# Patient Record
Sex: FEMALE
[De-identification: names, ages, dates, MRNs, and addresses within clinical notes are randomized per-mention and may not be internally consistent; named-entity substitution may affect disease eponyms.]

---

## 2017-12-12 ENCOUNTER — APPOINTMENT (OUTPATIENT)
Dept: ORTHOPEDIC SURGERY | Facility: CLINIC | Age: 57
End: 2017-12-12
Payer: MEDICAID

## 2017-12-12 VITALS — HEART RATE: 64 BPM | DIASTOLIC BLOOD PRESSURE: 79 MMHG | SYSTOLIC BLOOD PRESSURE: 121 MMHG

## 2017-12-12 DIAGNOSIS — M19.049 PRIMARY OSTEOARTHRITIS, UNSPECIFIED HAND: ICD-10-CM

## 2017-12-12 PROCEDURE — 99203 OFFICE O/P NEW LOW 30 MIN: CPT

## 2018-10-04 ENCOUNTER — APPOINTMENT (OUTPATIENT)
Dept: INTERNAL MEDICINE | Facility: CLINIC | Age: 58
End: 2018-10-04
Payer: MEDICAID

## 2018-10-04 VITALS — SYSTOLIC BLOOD PRESSURE: 110 MMHG | DIASTOLIC BLOOD PRESSURE: 80 MMHG

## 2018-10-04 VITALS
BODY MASS INDEX: 26.52 KG/M2 | TEMPERATURE: 98.7 F | OXYGEN SATURATION: 97 % | WEIGHT: 165 LBS | HEIGHT: 66 IN | HEART RATE: 80 BPM

## 2018-10-04 DIAGNOSIS — Z78.9 OTHER SPECIFIED HEALTH STATUS: ICD-10-CM

## 2018-10-04 DIAGNOSIS — Z12.11 ENCOUNTER FOR SCREENING FOR MALIGNANT NEOPLASM OF COLON: ICD-10-CM

## 2018-10-04 DIAGNOSIS — Z82.5 FAMILY HISTORY OF ASTHMA AND OTHER CHRONIC LOWER RESPIRATORY DISEASES: ICD-10-CM

## 2018-10-04 DIAGNOSIS — I34.0 NONRHEUMATIC MITRAL (VALVE) INSUFFICIENCY: ICD-10-CM

## 2018-10-04 DIAGNOSIS — Z82.49 FAMILY HISTORY OF ISCHEMIC HEART DISEASE AND OTHER DISEASES OF THE CIRCULATORY SYSTEM: ICD-10-CM

## 2018-10-04 DIAGNOSIS — Z87.442 PERSONAL HISTORY OF URINARY CALCULI: ICD-10-CM

## 2018-10-04 DIAGNOSIS — L40.9 PSORIASIS, UNSPECIFIED: ICD-10-CM

## 2018-10-04 PROCEDURE — 99204 OFFICE O/P NEW MOD 45 MIN: CPT | Mod: Q5

## 2018-10-04 RX ORDER — MELOXICAM 15 MG/1
15 TABLET ORAL DAILY
Qty: 30 | Refills: 1 | Status: DISCONTINUED | COMMUNITY
Start: 2017-12-12 | End: 2018-10-04

## 2018-10-04 NOTE — HISTORY OF PRESENT ILLNESS
[FreeTextEntry1] : here as a new patient [de-identified] : has a uri on eweek ago and now ear hurts

## 2018-10-04 NOTE — PHYSICAL EXAM
[No Acute Distress] : no acute distress [Well Nourished] : well nourished [Well Developed] : well developed [Well-Appearing] : well-appearing [Normal Sclera/Conjunctiva] : normal sclera/conjunctiva [PERRL] : pupils equal round and reactive to light [EOMI] : extraocular movements intact [Normal Outer Ear/Nose] : the outer ears and nose were normal in appearance [Normal Oropharynx] : the oropharynx was normal [No JVD] : no jugular venous distention [Supple] : supple [No Lymphadenopathy] : no lymphadenopathy [Thyroid Normal, No Nodules] : the thyroid was normal and there were no nodules present [No Respiratory Distress] : no respiratory distress  [Clear to Auscultation] : lungs were clear to auscultation bilaterally [No Accessory Muscle Use] : no accessory muscle use [Normal Rate] : normal rate  [Regular Rhythm] : with a regular rhythm [Normal S1, S2] : normal S1 and S2 [No Carotid Bruits] : no carotid bruits [No Abdominal Bruit] : a ~M bruit was not heard ~T in the abdomen [No Varicosities] : no varicosities [Pedal Pulses Present] : the pedal pulses are present [No Edema] : there was no peripheral edema [No Extremity Clubbing/Cyanosis] : no extremity clubbing/cyanosis [No Palpable Aorta] : no palpable aorta [Declined Breast Exam] : declined breast exam  [Soft] : abdomen soft [Non Tender] : non-tender [Non-distended] : non-distended [No Masses] : no abdominal mass palpated [No HSM] : no HSM [Normal Bowel Sounds] : normal bowel sounds [Declined Rectal Exam] : declined rectal exam [Normal Posterior Cervical Nodes] : no posterior cervical lymphadenopathy [Normal Anterior Cervical Nodes] : no anterior cervical lymphadenopathy [No CVA Tenderness] : no CVA  tenderness [No Spinal Tenderness] : no spinal tenderness [No Joint Swelling] : no joint swelling [Grossly Normal Strength/Tone] : grossly normal strength/tone [No Rash] : no rash [Normal Gait] : normal gait [Coordination Grossly Intact] : coordination grossly intact [No Focal Deficits] : no focal deficits [Deep Tendon Reflexes (DTR)] : deep tendon reflexes were 2+ and symmetric [Normal Affect] : the affect was normal [Normal Insight/Judgement] : insight and judgment were intact [de-identified] : grade 2-3 sys blowing murmur at apex, to left lat decub position

## 2018-10-04 NOTE — HEALTH RISK ASSESSMENT
[Any fall with injury in past year] : Patient reported fall with injury in the past year [0] : 2) Feeling down, depressed, or hopeless: Not at all (0) [] : No [de-identified] : PT reports being a social drinker. [de-identified] : pt reports breaking her hand this summer. [[id number]] : 0

## 2018-10-04 NOTE — PLAN
[FreeTextEntry1] : echo\par Study for H pylori: advised important to be checked for that\par same meds. \par no other changes today.\par see ENT for cerumenosis

## 2018-10-04 NOTE — REVIEW OF SYSTEMS
[Negative] : Eyes [FreeTextEntry5] : has a murmur [FreeTextEntry6] : no asthma [FreeTextEntry7] : has GERD, now on omeprazole, never had a upper endocoscopy.had cologuard [FreeTextEntry8] : two small stones stable,  [FreeTextEntry9] : leg cramps/ knee is ok [de-identified] : psoarais

## 2018-10-04 NOTE — ASSESSMENT
[FreeTextEntry1] : GERD, on meds, needs to be checked for H pylori,\par colonoscopy\par also document cardiac lesion.\par also same meds\par  see ent for f/u on cerumenosis

## 2018-10-31 ENCOUNTER — APPOINTMENT (OUTPATIENT)
Dept: OTOLARYNGOLOGY | Facility: CLINIC | Age: 58
End: 2018-10-31
Payer: MEDICAID

## 2018-10-31 ENCOUNTER — MEDICATION RENEWAL (OUTPATIENT)
Age: 58
End: 2018-10-31

## 2018-10-31 VITALS
HEIGHT: 66 IN | WEIGHT: 165 LBS | BODY MASS INDEX: 26.52 KG/M2 | SYSTOLIC BLOOD PRESSURE: 123 MMHG | DIASTOLIC BLOOD PRESSURE: 76 MMHG | HEART RATE: 69 BPM

## 2018-10-31 DIAGNOSIS — H60.8X3 OTHER OTITIS EXTERNA, BILATERAL: ICD-10-CM

## 2018-10-31 DIAGNOSIS — H61.20 IMPACTED CERUMEN, UNSPECIFIED EAR: ICD-10-CM

## 2018-10-31 PROCEDURE — 99203 OFFICE O/P NEW LOW 30 MIN: CPT

## 2018-11-14 ENCOUNTER — APPOINTMENT (OUTPATIENT)
Dept: OTOLARYNGOLOGY | Facility: CLINIC | Age: 58
End: 2018-11-14
Payer: MEDICAID

## 2018-11-14 VITALS
SYSTOLIC BLOOD PRESSURE: 122 MMHG | WEIGHT: 164 LBS | HEIGHT: 66 IN | HEART RATE: 67 BPM | BODY MASS INDEX: 26.36 KG/M2 | DIASTOLIC BLOOD PRESSURE: 75 MMHG

## 2018-11-14 PROCEDURE — 99213 OFFICE O/P EST LOW 20 MIN: CPT

## 2018-12-03 ENCOUNTER — MEDICATION RENEWAL (OUTPATIENT)
Age: 58
End: 2018-12-03

## 2019-01-09 ENCOUNTER — APPOINTMENT (OUTPATIENT)
Dept: INTERNAL MEDICINE | Facility: CLINIC | Age: 59
End: 2019-01-09
Payer: MEDICAID

## 2019-01-09 VITALS — SYSTOLIC BLOOD PRESSURE: 102 MMHG | DIASTOLIC BLOOD PRESSURE: 80 MMHG

## 2019-01-09 VITALS
HEIGHT: 66 IN | WEIGHT: 167 LBS | TEMPERATURE: 97.2 F | HEART RATE: 78 BPM | OXYGEN SATURATION: 97 % | BODY MASS INDEX: 26.84 KG/M2

## 2019-01-09 DIAGNOSIS — R05 COUGH: ICD-10-CM

## 2019-01-09 PROCEDURE — 99213 OFFICE O/P EST LOW 20 MIN: CPT

## 2019-01-09 NOTE — HISTORY OF PRESENT ILLNESS
[FreeTextEntry1] : here with cough and anxiety/ sick for more than two weeks [de-identified] : doing wwell.  had a uri as did her son

## 2019-01-09 NOTE — HEALTH RISK ASSESSMENT
[One fall no injury in past year] : Patient reported one fall in the past year without injury [0] : 2) Feeling down, depressed, or hopeless: Not at all (0) [] : No [[id number]] : 0

## 2019-01-09 NOTE — PHYSICAL EXAM
[No Acute Distress] : no acute distress [Well Nourished] : well nourished [Well Developed] : well developed [Normal Outer Ear/Nose] : the outer ears and nose were normal in appearance [Normal Oropharynx] : the oropharynx was normal [No Respiratory Distress] : no respiratory distress  [Clear to Auscultation] : lungs were clear to auscultation bilaterally [Normal Rate] : normal rate  [Regular Rhythm] : with a regular rhythm [de-identified] : Shauna dupont

## 2019-02-19 ENCOUNTER — RX RENEWAL (OUTPATIENT)
Age: 59
End: 2019-02-19

## 2019-03-20 ENCOUNTER — RX RENEWAL (OUTPATIENT)
Age: 59
End: 2019-03-20

## 2019-04-19 ENCOUNTER — RX RENEWAL (OUTPATIENT)
Age: 59
End: 2019-04-19

## 2019-04-21 ENCOUNTER — MOBILE ON CALL (OUTPATIENT)
Age: 59
End: 2019-04-21

## 2019-04-22 ENCOUNTER — RX RENEWAL (OUTPATIENT)
Age: 59
End: 2019-04-22

## 2019-06-01 ENCOUNTER — RX RENEWAL (OUTPATIENT)
Age: 59
End: 2019-06-01

## 2019-06-12 ENCOUNTER — RX RENEWAL (OUTPATIENT)
Age: 59
End: 2019-06-12

## 2019-06-17 ENCOUNTER — MEDICATION RENEWAL (OUTPATIENT)
Age: 59
End: 2019-06-17

## 2019-06-25 ENCOUNTER — APPOINTMENT (OUTPATIENT)
Dept: INTERNAL MEDICINE | Facility: CLINIC | Age: 59
End: 2019-06-25

## 2019-07-09 ENCOUNTER — RX RENEWAL (OUTPATIENT)
Age: 59
End: 2019-07-09

## 2019-07-15 ENCOUNTER — APPOINTMENT (OUTPATIENT)
Dept: INTERNAL MEDICINE | Facility: CLINIC | Age: 59
End: 2019-07-15
Payer: MEDICAID

## 2019-07-15 VITALS — TEMPERATURE: 98.5 F | HEIGHT: 66 IN | HEART RATE: 72 BPM | OXYGEN SATURATION: 94 %

## 2019-07-15 VITALS — SYSTOLIC BLOOD PRESSURE: 136 MMHG | DIASTOLIC BLOOD PRESSURE: 80 MMHG

## 2019-07-15 DIAGNOSIS — R25.2 CRAMP AND SPASM: ICD-10-CM

## 2019-07-15 DIAGNOSIS — Z00.00 ENCOUNTER FOR GENERAL ADULT MEDICAL EXAMINATION W/OUT ABNORMAL FINDINGS: ICD-10-CM

## 2019-07-15 DIAGNOSIS — F43.21 ADJUSTMENT DISORDER WITH DEPRESSED MOOD: ICD-10-CM

## 2019-07-15 PROCEDURE — 99214 OFFICE O/P EST MOD 30 MIN: CPT | Mod: 25

## 2019-07-15 PROCEDURE — 36415 COLL VENOUS BLD VENIPUNCTURE: CPT

## 2019-07-15 RX ORDER — CLONAZEPAM 1 MG/1
1 TABLET ORAL
Refills: 0 | Status: DISCONTINUED | COMMUNITY
End: 2019-07-15

## 2019-07-15 RX ORDER — BENZONATATE 200 MG/1
200 CAPSULE ORAL
Qty: 90 | Refills: 0 | Status: DISCONTINUED | COMMUNITY
Start: 2019-01-09 | End: 2019-07-15

## 2019-07-15 RX ORDER — CYCLOBENZAPRINE HYDROCHLORIDE 10 MG/1
10 TABLET, FILM COATED ORAL
Refills: 0 | Status: COMPLETED | COMMUNITY
End: 2019-07-15

## 2019-07-15 RX ORDER — PROMETHAZINE HYDROCHLORIDE AND DEXTROMETHORPHAN HYDROBROMIDE ORAL SOLUTION 15; 6.25 MG/5ML; MG/5ML
6.25-15 SOLUTION ORAL EVERY 6 HOURS
Qty: 240 | Refills: 0 | Status: DISCONTINUED | COMMUNITY
Start: 2019-01-09 | End: 2019-07-15

## 2019-07-15 RX ORDER — PROMETHAZINE HYDROCHLORIDE AND CODEINE PHOSPHATE 6.25; 1 MG/5ML; MG/5ML
6.25-1 SOLUTION ORAL
Qty: 240 | Refills: 0 | Status: DISCONTINUED | COMMUNITY
Start: 2019-01-09 | End: 2019-07-15

## 2019-07-15 RX ORDER — CLARITHROMYCIN 500 MG/1
500 TABLET, FILM COATED ORAL
Qty: 14 | Refills: 0 | Status: DISCONTINUED | COMMUNITY
Start: 2019-01-09 | End: 2019-07-15

## 2019-07-15 NOTE — PHYSICAL EXAM
[Normal] : no carotid or abdominal bruits heard, no varicosities, pedal pulses are present, no peripheral edema, no extremity clubbing or cyanosis and no palpable aorta [de-identified] : worried and upset in nad [de-identified] : throat neg. LN not enlarged tm ok

## 2019-07-15 NOTE — HISTORY OF PRESENT ILLNESS
[FreeTextEntry1] : stress and grief [de-identified] : father  and had oralia's gangrene first at [facility] and then to [facility] and then back at [facility],  and then he finally  and her sister is unhelpful. \par can't sleep, took ambien in the past\par not feeling well. sore throat and achy, gained weight , worried and upset

## 2019-07-15 NOTE — PLAN
[FreeTextEntry1] : trial of trazodone to take and increase dose to 150 mg slowly over several weeks. also consider vascular consul. refuses to see a therapist at this time\par length of discussion with patient 25 min and also patient requests labs

## 2019-07-15 NOTE — HEALTH RISK ASSESSMENT
[No] : In the past 12 months have you used drugs other than those required for medical reasons? No [No falls in past year] : Patient reported no falls in the past year [0] : 1) Little interest or pleasure doing things: Not at all (0) [1] : 2) Feeling down, depressed, or hopeless for several days (1) [] : No [de-identified] : Dermatologist [Audit-CScore] : 0 [de-identified] : No [de-identified] : No [FreeTextEntry1] : Pt reports feeling depressed, a death in the family. [[id number]] : 0

## 2019-07-15 NOTE — REVIEW OF SYSTEMS
[Negative] : Gastrointestinal [FreeTextEntry2] : wee above [FreeTextEntry4] : sore throat [FreeTextEntry9] : leg cramps disruptive, can't sleep and they disturb her immensely cyclobenzaprine not helpful

## 2019-07-29 ENCOUNTER — RX RENEWAL (OUTPATIENT)
Age: 59
End: 2019-07-29

## 2019-08-19 ENCOUNTER — RX RENEWAL (OUTPATIENT)
Age: 59
End: 2019-08-19

## 2019-09-03 ENCOUNTER — RX RENEWAL (OUTPATIENT)
Age: 59
End: 2019-09-03

## 2019-09-14 ENCOUNTER — RX RENEWAL (OUTPATIENT)
Age: 59
End: 2019-09-14

## 2019-09-25 ENCOUNTER — RX RENEWAL (OUTPATIENT)
Age: 59
End: 2019-09-25

## 2019-11-05 ENCOUNTER — RX RENEWAL (OUTPATIENT)
Age: 59
End: 2019-11-05

## 2019-11-14 ENCOUNTER — APPOINTMENT (OUTPATIENT)
Dept: INTERNAL MEDICINE | Facility: CLINIC | Age: 59
End: 2019-11-14
Payer: MEDICAID

## 2019-11-14 VITALS
DIASTOLIC BLOOD PRESSURE: 78 MMHG | SYSTOLIC BLOOD PRESSURE: 129 MMHG | HEIGHT: 66 IN | TEMPERATURE: 98.2 F | OXYGEN SATURATION: 98 % | HEART RATE: 63 BPM

## 2019-11-14 PROCEDURE — 99213 OFFICE O/P EST LOW 20 MIN: CPT

## 2019-11-14 RX ORDER — TRAZODONE HYDROCHLORIDE 150 MG/1
150 TABLET ORAL
Qty: 90 | Refills: 1 | Status: COMPLETED | COMMUNITY
Start: 2019-07-15 | End: 2019-11-14

## 2019-11-14 RX ORDER — ZOLPIDEM TARTRATE 10 MG/1
10 TABLET ORAL
Qty: 30 | Refills: 1 | Status: ACTIVE | COMMUNITY
Start: 2019-11-14 | End: 1900-01-01

## 2019-11-14 NOTE — PLAN
[FreeTextEntry1] : really needs to see therapist regularly and work on behavior modification\par also trial of cymbalta to try to decrease her general anxiety level\par prefers to alternate ambien and clonopin\par recneck one to 2 mo

## 2019-11-14 NOTE — PHYSICAL EXAM
[Normal] : no respiratory distress, lungs were clear to auscultation bilaterally and no accessory muscle use [de-identified] : very stressed, pressured speech

## 2019-11-14 NOTE — HEALTH RISK ASSESSMENT
[No] : No [One fall no injury in past year] : Patient reported one fall in the past year without injury [3] : 2) Feeling down, depressed, or hopeless for nearly every day (3) [Audit-CScore] : 0 [] : No [de-identified] : rheumatologist,orthopedics   [de-identified] : none [de-identified] : none [de-identified] : fell and injured both knees  [THB4Sttat] : 6

## 2019-11-14 NOTE — HISTORY OF PRESENT ILLNESS
[FreeTextEntry1] : side effects from the trazodone [de-identified] : forgot her meds  and had a reaction an dfelt jittery when she stopped it. says she is depressed\par she says that she is always overwhelmed, too many things going on. doesn't find gomez in things, afraid to get addicted

## 2019-12-30 RX ORDER — ZOLPIDEM TARTRATE 10 MG/1
10 TABLET ORAL
Qty: 10 | Refills: 0 | Status: ACTIVE | COMMUNITY
Start: 2019-12-30 | End: 1900-01-01

## 2020-01-13 ENCOUNTER — RX RENEWAL (OUTPATIENT)
Age: 60
End: 2020-01-13

## 2020-01-13 RX ORDER — FLUOCINOLONE ACETONIDE 0.11 MG/ML
0.01 OIL AURICULAR (OTIC)
Qty: 20 | Refills: 0 | Status: ACTIVE | COMMUNITY
Start: 2018-10-31 | End: 1900-01-01

## 2020-01-30 ENCOUNTER — RX RENEWAL (OUTPATIENT)
Age: 60
End: 2020-01-30

## 2020-02-10 ENCOUNTER — RX RENEWAL (OUTPATIENT)
Age: 60
End: 2020-02-10

## 2020-02-27 RX ORDER — OMEPRAZOLE 40 MG/1
40 CAPSULE, DELAYED RELEASE ORAL
Qty: 90 | Refills: 1 | Status: ACTIVE | COMMUNITY
Start: 2019-03-20 | End: 1900-01-01

## 2020-05-04 ENCOUNTER — RX RENEWAL (OUTPATIENT)
Age: 60
End: 2020-05-04

## 2020-05-07 ENCOUNTER — APPOINTMENT (OUTPATIENT)
Dept: INTERNAL MEDICINE | Facility: CLINIC | Age: 60
End: 2020-05-07
Payer: MEDICAID

## 2020-05-07 PROCEDURE — 99201 OFFICE OUTPATIENT NEW 10 MINUTES: CPT | Mod: 95

## 2020-05-07 PROCEDURE — 99212 OFFICE O/P EST SF 10 MIN: CPT | Mod: 95

## 2020-05-07 RX ORDER — DULOXETINE HYDROCHLORIDE 60 MG/1
60 CAPSULE, DELAYED RELEASE PELLETS ORAL
Qty: 90 | Refills: 1 | Status: ACTIVE | COMMUNITY
Start: 2019-11-14 | End: 1900-01-01

## 2020-06-15 ENCOUNTER — APPOINTMENT (OUTPATIENT)
Dept: INTERNAL MEDICINE | Facility: CLINIC | Age: 60
End: 2020-06-15

## 2020-07-08 ENCOUNTER — APPOINTMENT (OUTPATIENT)
Dept: INTERNAL MEDICINE | Facility: CLINIC | Age: 60
End: 2020-07-08
Payer: MEDICAID

## 2020-07-08 DIAGNOSIS — F41.9 ANXIETY DISORDER, UNSPECIFIED: ICD-10-CM

## 2020-07-08 DIAGNOSIS — G47.00 INSOMNIA, UNSPECIFIED: ICD-10-CM

## 2020-07-08 PROCEDURE — 99213 OFFICE O/P EST LOW 20 MIN: CPT | Mod: 95

## 2020-07-08 PROCEDURE — 99202 OFFICE O/P NEW SF 15 MIN: CPT | Mod: 95

## 2020-07-09 PROBLEM — G47.00 INSOMNIA: Status: ACTIVE | Noted: 2019-07-15

## 2020-07-09 PROBLEM — F41.9 ANXIETY: Status: ACTIVE | Noted: 2018-10-04

## 2020-07-09 NOTE — HEALTH RISK ASSESSMENT
[No] : No [No falls in past year] : Patient reported no falls in the past year [0] : 2) Feeling down, depressed, or hopeless: Not at all (0) [] : No [de-identified] : No [de-identified] : No [de-identified] : None [de-identified] : Regular  [[id number]] : 0

## 2020-07-09 NOTE — HISTORY OF PRESENT ILLNESS
[FreeTextEntry1] : visit conducted via telehealth format lasted 12 minutes [de-identified] : no current complaints takes Klonopin for management of chronic anxiety disorder.\par medically stable\par very anxious re Covid

## 2020-07-14 ENCOUNTER — APPOINTMENT (OUTPATIENT)
Dept: OBGYN | Facility: CLINIC | Age: 60
End: 2020-07-14

## 2020-08-18 RX ORDER — CLONAZEPAM 1 MG/1
1 TABLET ORAL
Qty: 90 | Refills: 0 | Status: ACTIVE | COMMUNITY
Start: 2018-10-31 | End: 1900-01-01

## 2024-11-13 ENCOUNTER — OFFICE (OUTPATIENT)
Dept: URBAN - METROPOLITAN AREA CLINIC 77 | Facility: CLINIC | Age: 64
Setting detail: OPHTHALMOLOGY
End: 2024-11-13
Payer: MEDICAID

## 2024-11-13 DIAGNOSIS — H43.813: ICD-10-CM

## 2024-11-13 DIAGNOSIS — H53.40: ICD-10-CM

## 2024-11-13 DIAGNOSIS — H47.013: ICD-10-CM

## 2024-11-13 DIAGNOSIS — H47.213: ICD-10-CM

## 2024-11-13 DIAGNOSIS — H40.1231: ICD-10-CM

## 2024-11-13 DIAGNOSIS — I33.0: ICD-10-CM

## 2024-11-13 PROCEDURE — 92250 FUNDUS PHOTOGRAPHY W/I&R: CPT | Performed by: OPHTHALMOLOGY

## 2024-11-13 PROCEDURE — 92235 FLUORESCEIN ANGRPH MLTIFRAME: CPT | Performed by: OPHTHALMOLOGY

## 2024-11-13 PROCEDURE — 92083 EXTENDED VISUAL FIELD XM: CPT | Performed by: OPHTHALMOLOGY

## 2024-11-13 PROCEDURE — 99204 OFFICE O/P NEW MOD 45 MIN: CPT | Performed by: OPHTHALMOLOGY

## 2024-11-13 ASSESSMENT — KERATOMETRY
OD_K1POWER_DIOPTERS: 42.50
OD_K2POWER_DIOPTERS: 43.25
OD_AXISANGLE_DEGREES: 126
OS_K1POWER_DIOPTERS: 42.25
OS_K2POWER_DIOPTERS: 42.50
OS_AXISANGLE_DEGREES: 108

## 2024-11-13 ASSESSMENT — REFRACTION_CURRENTRX
OD_CYLINDER: -0.50
OS_OVR_VA: 20/
OS_CYLINDER: 0.00
OS_AXIS: 0
OD_SPHERE: -2.75
OS_SPHERE: -2.00
OD_ADD: +2.50
OD_OVR_VA: 20/
OD_AXIS: 81
OS_ADD: +2.50

## 2024-11-13 ASSESSMENT — REFRACTION_AUTOREFRACTION
OS_SPHERE: -1.00
OS_CYLINDER: -0.50
OD_SPHERE: -2.00
OD_CYLINDER: -1.50
OS_AXIS: 030
OD_AXIS: 059

## 2024-11-13 ASSESSMENT — CONFRONTATIONAL VISUAL FIELD TEST (CVF)
OD_FINDINGS: FULL
OS_FINDINGS: FULL

## 2024-11-13 ASSESSMENT — VISUAL ACUITY: OS_BCVA: 20/20

## 2024-11-13 ASSESSMENT — TONOMETRY
OS_IOP_MMHG: 12
OD_IOP_MMHG: 10

## 2025-02-20 ENCOUNTER — RX ONLY (RX ONLY)
Age: 65
End: 2025-02-20

## 2025-02-20 ENCOUNTER — OFFICE (OUTPATIENT)
Dept: URBAN - METROPOLITAN AREA CLINIC 77 | Facility: CLINIC | Age: 65
Setting detail: OPHTHALMOLOGY
End: 2025-02-20
Payer: MEDICAID

## 2025-02-20 DIAGNOSIS — H53.40: ICD-10-CM

## 2025-02-20 DIAGNOSIS — H40.1231: ICD-10-CM

## 2025-02-20 DIAGNOSIS — H47.213: ICD-10-CM

## 2025-02-20 DIAGNOSIS — H43.813: ICD-10-CM

## 2025-02-20 DIAGNOSIS — H47.013: ICD-10-CM

## 2025-02-20 PROCEDURE — 92133 CPTRZD OPH DX IMG PST SGM ON: CPT | Performed by: OPHTHALMOLOGY

## 2025-02-20 PROCEDURE — 92202 OPSCPY EXTND ON/MAC DRAW: CPT | Performed by: OPHTHALMOLOGY

## 2025-02-20 PROCEDURE — 92083 EXTENDED VISUAL FIELD XM: CPT | Performed by: OPHTHALMOLOGY

## 2025-02-20 PROCEDURE — 99214 OFFICE O/P EST MOD 30 MIN: CPT | Performed by: OPHTHALMOLOGY

## 2025-02-20 ASSESSMENT — REFRACTION_CURRENTRX
OS_OVR_VA: 20/
OS_CYLINDER: 0.00
OS_ADD: +2.50
OS_AXIS: 0
OD_AXIS: 81
OD_ADD: +2.50
OD_OVR_VA: 20/
OD_CYLINDER: -0.50
OS_SPHERE: -2.00
OD_SPHERE: -2.75

## 2025-02-20 ASSESSMENT — VISUAL ACUITY
OD_BCVA: 20/30
OS_BCVA: 20/25

## 2025-02-20 ASSESSMENT — REFRACTION_AUTOREFRACTION
OD_CYLINDER: -1.50
OS_CYLINDER: -0.50
OS_SPHERE: -1.00
OS_AXIS: 030
OD_AXIS: 059
OD_SPHERE: -2.00

## 2025-02-20 ASSESSMENT — KERATOMETRY
OD_K2POWER_DIOPTERS: 43.25
OS_AXISANGLE_DEGREES: 108
OS_K1POWER_DIOPTERS: 42.25
OD_K1POWER_DIOPTERS: 42.50
OD_AXISANGLE_DEGREES: 126
OS_K2POWER_DIOPTERS: 42.50

## 2025-02-20 ASSESSMENT — CONFRONTATIONAL VISUAL FIELD TEST (CVF)
OD_FINDINGS: FULL
OS_FINDINGS: FULL

## 2025-06-24 ENCOUNTER — RX ONLY (RX ONLY)
Age: 65
End: 2025-06-24

## 2025-06-24 ENCOUNTER — OFFICE (OUTPATIENT)
Facility: LOCATION | Age: 65
Setting detail: OPHTHALMOLOGY
End: 2025-06-24
Payer: MEDICARE

## 2025-06-24 DIAGNOSIS — H47.213: ICD-10-CM

## 2025-06-24 DIAGNOSIS — H47.013: ICD-10-CM

## 2025-06-24 DIAGNOSIS — H40.1231: ICD-10-CM

## 2025-06-24 DIAGNOSIS — H53.40: ICD-10-CM

## 2025-06-24 PROCEDURE — 92020 GONIOSCOPY: CPT | Performed by: OPHTHALMOLOGY

## 2025-06-24 PROCEDURE — 92133 CPTRZD OPH DX IMG PST SGM ON: CPT | Performed by: OPHTHALMOLOGY

## 2025-06-24 PROCEDURE — 92083 EXTENDED VISUAL FIELD XM: CPT | Performed by: OPHTHALMOLOGY

## 2025-06-24 PROCEDURE — 92014 COMPRE OPH EXAM EST PT 1/>: CPT | Performed by: OPHTHALMOLOGY

## 2025-06-24 PROCEDURE — 76514 ECHO EXAM OF EYE THICKNESS: CPT | Performed by: OPHTHALMOLOGY

## 2025-06-24 ASSESSMENT — REFRACTION_CURRENTRX
OD_CYLINDER: -0.50
OS_CYLINDER: 0.00
OS_SPHERE: -2.00
OD_OVR_VA: 20/
OS_ADD: +2.50
OD_ADD: +2.50
OD_AXIS: 81
OD_SPHERE: -2.75
OS_OVR_VA: 20/
OS_AXIS: 0

## 2025-06-24 ASSESSMENT — KERATOMETRY
OD_K2POWER_DIOPTERS: 43.00
OD_AXISANGLE_DEGREES: 151
OS_K2POWER_DIOPTERS: 42.25
OD_K1POWER_DIOPTERS: 42.75
OS_K1POWER_DIOPTERS: 41.50
OS_AXISANGLE_DEGREES: 166

## 2025-06-24 ASSESSMENT — PACHYMETRY
OD_CT_CORRECTION: -4
OS_CT_UM: 579
OS_CT_CORRECTION: -2
OD_CT_UM: 595

## 2025-06-24 ASSESSMENT — REFRACTION_AUTOREFRACTION
OS_SPHERE: -0.50
OS_CYLINDER: -0.50
OD_SPHERE: -1.50
OD_CYLINDER: -1.00
OS_AXIS: 077
OD_AXIS: 089

## 2025-06-24 ASSESSMENT — VISUAL ACUITY
OD_BCVA: 20/20-
OS_BCVA: 20/20-

## 2025-06-24 ASSESSMENT — CONFRONTATIONAL VISUAL FIELD TEST (CVF)
OD_FINDINGS: FULL
OS_FINDINGS: FULL

## 2025-06-24 ASSESSMENT — TONOMETRY
OS_IOP_MMHG: 10
OD_IOP_MMHG: 12

## 2025-07-14 ENCOUNTER — OFFICE (OUTPATIENT)
Facility: LOCATION | Age: 65
Setting detail: OPHTHALMOLOGY
End: 2025-07-14
Payer: MEDICARE

## 2025-07-14 DIAGNOSIS — G70.00: ICD-10-CM

## 2025-07-14 DIAGNOSIS — H02.403: ICD-10-CM

## 2025-07-14 PROCEDURE — 99214 OFFICE O/P EST MOD 30 MIN: CPT | Performed by: OPHTHALMOLOGY

## 2025-07-14 ASSESSMENT — VISUAL ACUITY
OD_BCVA: 20/40+2
OS_BCVA: 20/40+2

## 2025-07-14 ASSESSMENT — KERATOMETRY
OS_K2POWER_DIOPTERS: 42.25
OS_K1POWER_DIOPTERS: 41.50
OD_AXISANGLE_DEGREES: 151
OS_AXISANGLE_DEGREES: 166
OD_K1POWER_DIOPTERS: 42.75
OD_K2POWER_DIOPTERS: 43.00

## 2025-07-14 ASSESSMENT — CONFRONTATIONAL VISUAL FIELD TEST (CVF)
OD_FINDINGS: FULL
OS_FINDINGS: FULL

## 2025-07-14 ASSESSMENT — REFRACTION_AUTOREFRACTION
OD_AXIS: 089
OD_SPHERE: -1.50
OS_SPHERE: -0.50
OS_CYLINDER: -0.50
OS_AXIS: 077
OD_CYLINDER: -1.00

## 2025-07-14 ASSESSMENT — LID POSITION - PTOSIS
OD_PTOSIS: RUL 3+
OS_PTOSIS: LUL 2+

## 2025-08-06 ENCOUNTER — OFFICE (OUTPATIENT)
Dept: URBAN - METROPOLITAN AREA CLINIC 77 | Facility: CLINIC | Age: 65
Setting detail: OPHTHALMOLOGY
End: 2025-08-06
Payer: MEDICARE

## 2025-08-06 DIAGNOSIS — H47.013: ICD-10-CM

## 2025-08-06 DIAGNOSIS — H43.813: ICD-10-CM

## 2025-08-06 DIAGNOSIS — H02.403: ICD-10-CM

## 2025-08-06 DIAGNOSIS — H40.1231: ICD-10-CM

## 2025-08-06 DIAGNOSIS — H53.40: ICD-10-CM

## 2025-08-06 DIAGNOSIS — G70.00: ICD-10-CM

## 2025-08-06 DIAGNOSIS — H47.213: ICD-10-CM

## 2025-08-06 PROCEDURE — 99213 OFFICE O/P EST LOW 20 MIN: CPT | Performed by: OPHTHALMOLOGY

## 2025-08-06 PROCEDURE — 92250 FUNDUS PHOTOGRAPHY W/I&R: CPT | Performed by: OPHTHALMOLOGY

## 2025-08-06 ASSESSMENT — KERATOMETRY
OS_K1POWER_DIOPTERS: 41.50
OD_K1POWER_DIOPTERS: 42.75
OD_AXISANGLE_DEGREES: 151
OD_K2POWER_DIOPTERS: 43.00
OS_K2POWER_DIOPTERS: 42.25
OS_AXISANGLE_DEGREES: 166

## 2025-08-06 ASSESSMENT — REFRACTION_AUTOREFRACTION
OD_CYLINDER: -1.00
OS_AXIS: 077
OS_CYLINDER: -0.50
OD_SPHERE: -1.50
OS_SPHERE: -0.50
OD_AXIS: 089

## 2025-08-06 ASSESSMENT — LID POSITION - PTOSIS
OD_PTOSIS: RUL 3+
OS_PTOSIS: LUL 2+

## 2025-08-06 ASSESSMENT — CONFRONTATIONAL VISUAL FIELD TEST (CVF)
OS_FINDINGS: FULL
OD_FINDINGS: FULL

## 2025-08-06 ASSESSMENT — VISUAL ACUITY
OS_BCVA: 20/30-
OD_BCVA: 20/25